# Patient Record
(demographics unavailable — no encounter records)

---

## 2025-07-17 NOTE — PHYSICAL EXAM
[Chaperoned Physical Exam] : A chaperone was present in the examining room during all aspects of the physical examination. [FreeTextEntry2] : [place] [Vulvitis] : vulvitis [Labia Majora] : normal [Labia Minora] : normal [Normal] : normal [Uterine Adnexae] : normal [FreeTextEntry1] : Lichen Planus of external genitalia.

## 2025-07-17 NOTE — PLAN
[FreeTextEntry1] : Patient counselled. Will add tacrolimus to the steroid/antifungal cream Patient instructed to contact me if the symptoms do not improve.

## 2025-07-17 NOTE — HISTORY OF PRESENT ILLNESS
[FreeTextEntry1] : 63 y/o P3 here today for follow-up visit.  Last seen in August 2024 for annual visit.   Pt C/O vulvovaginal irritation and burning, worse after urination. H/O chronic vulvitis. Was given Rx for Lotrisone cream at last visit which was helpful until recently. Denies vaginal discharge, itching, pelvic pain, urinary symptoms, or PMB. Pt is newly .  H/O HSV. No recent outbreaks. Requesting valtrex refills.  Denies changes in medical history or medication use.